# Patient Record
Sex: MALE | Race: WHITE | NOT HISPANIC OR LATINO | Employment: OTHER | URBAN - NONMETROPOLITAN AREA
[De-identification: names, ages, dates, MRNs, and addresses within clinical notes are randomized per-mention and may not be internally consistent; named-entity substitution may affect disease eponyms.]

---

## 2023-10-13 LAB
NON-UH HIE TROPONIN I HIGH SENSITIVITY: ABNORMAL PG/ML (ref 0–20)
NON-UH HIE TROPONIN I HIGH SENSITIVITY: ABNORMAL PG/ML (ref 0–20)

## 2023-10-14 LAB
NON-UH HIE ANION GAP: 12.1 (ref 6–15)
NON-UH HIE BASOPHILS # (AUTO): 0.1 10*3/UL (ref 0–0.2)
NON-UH HIE BASOPHILS % (AUTO): 0.8 %
NON-UH HIE BLOOD UREA NITROGEN: 19 MG/DL (ref 7–25)
NON-UH HIE CALCIUM: 8.9 MG/DL (ref 8.6–10.3)
NON-UH HIE CARBON DIOXIDE: 23.1 MMOL/L (ref 21–31)
NON-UH HIE CHLORIDE: 105 MMOL/L (ref 98–107)
NON-UH HIE CHOL/HDL RATIO: 2.9
NON-UH HIE CHOLESTEROL: 76 MG/DL (ref 140–200)
NON-UH HIE CREATININE CLR CALC PHARMACY: 51.22
NON-UH HIE CREATININE: 1.04 MG/DL (ref 0.7–1.3)
NON-UH HIE EOSINOPHILS # (AUTO): 0.2 10*3/UL (ref 0–0.45)
NON-UH HIE EOSINOPHILS % (AUTO): 1.8 %
NON-UH HIE ESTIMATED GFR: > 60
NON-UH HIE GLUCOSE: 157 MG/DL (ref 70–100)
NON-UH HIE HDL CHOLESTEROL: 26 MG/DL (ref 23–92)
NON-UH HIE HEMATOCRIT: 38 % (ref 38.8–50)
NON-UH HIE HEMOGLOBIN: 12.9 G/DL (ref 13–17)
NON-UH HIE LDL CHOLESTEROL,CALCULATED: 29 MG/DL (ref 0–100)
NON-UH HIE LYMPHOCYTES # (AUTO): 1.4 10*3/UL (ref 1–4.8)
NON-UH HIE LYMPHOCYTES % (AUTO): 11.9 %
NON-UH HIE MEAN CORPUSCULAR HEMOGLOBIN: 30.1 PG (ref 27.5–35.2)
NON-UH HIE MEAN CORPUSCULAR HGB CONC: 34 G/DL (ref 32.5–35.6)
NON-UH HIE MEAN CORPUSCULAR VOLUME: 88.6 FL (ref 83.5–101)
NON-UH HIE MEAN PLATELET VOLUME: 8.4 FL (ref 6.6–10.1)
NON-UH HIE MONOCYTES # (AUTO): 0.7 10*3/UL (ref 0–0.8)
NON-UH HIE MONOCYTES % (AUTO): 5.7 %
NON-UH HIE NEUTROPHILS # (AUTO): 9.4 10*3/UL (ref 1.8–7.7)
NON-UH HIE NEUTROPHILS % (AUTO): 79.8 %
NON-UH HIE NRBC%: 0 /100{WBC} (ref 0–0.5)
NON-UH HIE PLATELET COUNT: 222 10*3/UL (ref 150–450)
NON-UH HIE POTASSIUM: 4.2 MMOL/L (ref 3.5–5.1)
NON-UH HIE RED BLOOD COUNT: 4.29 (ref 3.9–5.6)
NON-UH HIE RED CELL DISTRIBUTION WIDTH: 14.6 % (ref 12–14.8)
NON-UH HIE SODIUM: 136 MMOL/L (ref 136–145)
NON-UH HIE TRIGLYCERIDE W/REFLEX: 104 MG/DL (ref 0–149)
NON-UH HIE TROPONIN I HIGH SENSITIVITY: ABNORMAL PG/ML (ref 0–20)
NON-UH HIE UNCORRECTED WBC: 11.8 10*3/UL (ref 4.1–10.5)
NON-UH HIE VLDL CHOLESTEROL: 20 MG/DL
NON-UH HIE WHITE BLOOD COUNT: 11.8 10*3/UL (ref 4.1–10.5)

## 2023-10-14 PROCEDURE — 93306 TTE W/DOPPLER COMPLETE: CPT | Performed by: INTERNAL MEDICINE

## 2023-10-24 ENCOUNTER — TELEPHONE (OUTPATIENT)
Dept: CARDIOLOGY | Facility: CLINIC | Age: 83
End: 2023-10-24
Payer: OTHER GOVERNMENT

## 2023-10-24 DIAGNOSIS — I21.4 NSTEMI (NON-ST ELEVATED MYOCARDIAL INFARCTION) (MULTI): Primary | ICD-10-CM

## 2023-10-24 NOTE — TELEPHONE ENCOUNTER
Daughter in law phoned asking when patient will see Dr. Mike Polanco DO in office due to recent [facility] discharge for a heart attack. Medical records requested. Sent message to scheduling to follow up.

## 2023-11-08 ENCOUNTER — TELEPHONE (OUTPATIENT)
Dept: CARDIOLOGY | Facility: CLINIC | Age: 83
End: 2023-11-08
Payer: COMMERCIAL

## 2023-11-08 DIAGNOSIS — I21.4 NSTEMI (NON-ST ELEVATED MYOCARDIAL INFARCTION) (MULTI): ICD-10-CM

## 2023-11-08 RX ORDER — ASPIRIN 81 MG/1
81 TABLET ORAL DAILY
COMMUNITY
Start: 2023-09-22

## 2023-11-08 RX ORDER — ATORVASTATIN CALCIUM 80 MG/1
40 TABLET, FILM COATED ORAL NIGHTLY
COMMUNITY
Start: 2023-09-22

## 2023-11-08 RX ORDER — NITROGLYCERIN 0.4 MG/1
0.4 TABLET SUBLINGUAL EVERY 5 MIN PRN
COMMUNITY
Start: 2023-09-22

## 2023-11-08 RX ORDER — TICAGRELOR 90 MG/1
90 TABLET ORAL EVERY 12 HOURS
COMMUNITY
Start: 2023-09-22 | End: 2023-10-22

## 2023-11-08 NOTE — TELEPHONE ENCOUNTER
Nurse at Cardiac Rehab at [facility] called states they need a new order for Cardiac Rehab due to a new stent placed since 09/2023. He can start 11/13/2023..    Fax new order referral to [phone]

## 2023-12-20 ENCOUNTER — APPOINTMENT (OUTPATIENT)
Dept: CARDIOLOGY | Facility: CLINIC | Age: 83
End: 2023-12-20
Payer: COMMERCIAL

## 2023-12-21 ENCOUNTER — APPOINTMENT (OUTPATIENT)
Dept: CARDIOLOGY | Facility: CLINIC | Age: 83
End: 2023-12-21
Payer: COMMERCIAL

## 2024-04-10 ENCOUNTER — TELEPHONE (OUTPATIENT)
Dept: CARDIOLOGY | Facility: CLINIC | Age: 84
End: 2024-04-10

## 2024-04-10 ENCOUNTER — OFFICE VISIT (OUTPATIENT)
Dept: CARDIOLOGY | Facility: CLINIC | Age: 84
End: 2024-04-10
Payer: OTHER GOVERNMENT

## 2024-04-10 VITALS
HEIGHT: 64 IN | WEIGHT: 142.4 LBS | DIASTOLIC BLOOD PRESSURE: 72 MMHG | SYSTOLIC BLOOD PRESSURE: 160 MMHG | BODY MASS INDEX: 24.31 KG/M2 | HEART RATE: 80 BPM

## 2024-04-10 DIAGNOSIS — Z98.61 HISTORY OF PTCA: ICD-10-CM

## 2024-04-10 DIAGNOSIS — E11.9 TYPE 2 DIABETES MELLITUS WITHOUT COMPLICATION, WITHOUT LONG-TERM CURRENT USE OF INSULIN (MULTI): ICD-10-CM

## 2024-04-10 DIAGNOSIS — I10 ESSENTIAL HYPERTENSION: ICD-10-CM

## 2024-04-10 DIAGNOSIS — I25.5 ISCHEMIC CARDIOMYOPATHY: ICD-10-CM

## 2024-04-10 DIAGNOSIS — I50.9 CONGESTIVE HEART FAILURE, NYHA CLASS 2 AND ACC/AHA STAGE C (MULTI): ICD-10-CM

## 2024-04-10 DIAGNOSIS — E78.2 MIXED HYPERLIPIDEMIA: ICD-10-CM

## 2024-04-10 DIAGNOSIS — I25.119 CORONARY ARTERY DISEASE INVOLVING NATIVE CORONARY ARTERY OF NATIVE HEART WITH ANGINA PECTORIS (CMS-HCC): ICD-10-CM

## 2024-04-10 DIAGNOSIS — F17.200 CURRENT SMOKER: ICD-10-CM

## 2024-04-10 PROBLEM — E78.5 HYPERLIPIDEMIA: Status: ACTIVE | Noted: 2024-04-10

## 2024-04-10 PROBLEM — J44.9 COPD (CHRONIC OBSTRUCTIVE PULMONARY DISEASE) (MULTI): Status: ACTIVE | Noted: 2024-04-10

## 2024-04-10 PROBLEM — I25.9 CHRONIC ISCHEMIC HEART DISEASE: Status: ACTIVE | Noted: 2024-04-10

## 2024-04-10 PROBLEM — K21.9 GASTROESOPHAGEAL REFLUX DISEASE: Status: ACTIVE | Noted: 2024-04-10

## 2024-04-10 PROBLEM — I25.9 CHRONIC ISCHEMIC HEART DISEASE: Status: RESOLVED | Noted: 2024-04-10 | Resolved: 2024-04-10

## 2024-04-10 PROCEDURE — 4004F PT TOBACCO SCREEN RCVD TLK: CPT | Performed by: INTERNAL MEDICINE

## 2024-04-10 PROCEDURE — 1159F MED LIST DOCD IN RCRD: CPT | Performed by: INTERNAL MEDICINE

## 2024-04-10 PROCEDURE — 3077F SYST BP >= 140 MM HG: CPT | Performed by: INTERNAL MEDICINE

## 2024-04-10 PROCEDURE — 99215 OFFICE O/P EST HI 40 MIN: CPT | Performed by: INTERNAL MEDICINE

## 2024-04-10 PROCEDURE — 1160F RVW MEDS BY RX/DR IN RCRD: CPT | Performed by: INTERNAL MEDICINE

## 2024-04-10 PROCEDURE — 99406 BEHAV CHNG SMOKING 3-10 MIN: CPT | Performed by: INTERNAL MEDICINE

## 2024-04-10 PROCEDURE — 3078F DIAST BP <80 MM HG: CPT | Performed by: INTERNAL MEDICINE

## 2024-04-10 RX ORDER — LISINOPRIL 20 MG/1
20 TABLET ORAL DAILY
COMMUNITY
Start: 2023-09-22 | End: 2024-04-10 | Stop reason: ALTCHOICE

## 2024-04-10 RX ORDER — SPIRONOLACTONE 25 MG/1
12.5 TABLET ORAL DAILY
Qty: 45 TABLET | Refills: 3 | Status: SHIPPED | OUTPATIENT
Start: 2024-04-10 | End: 2024-04-10 | Stop reason: SDUPTHER

## 2024-04-10 RX ORDER — METFORMIN HYDROCHLORIDE 850 MG/1
850 TABLET ORAL
COMMUNITY
Start: 2023-06-21

## 2024-04-10 RX ORDER — VALSARTAN 80 MG/1
80 TABLET ORAL DAILY
Qty: 90 TABLET | Refills: 3 | Status: SHIPPED | OUTPATIENT
Start: 2024-04-10 | End: 2024-04-10 | Stop reason: SDUPTHER

## 2024-04-10 RX ORDER — METOPROLOL TARTRATE 50 MG/1
50 TABLET ORAL 2 TIMES DAILY
COMMUNITY
Start: 2023-09-22

## 2024-04-10 ASSESSMENT — ENCOUNTER SYMPTOMS: SHORTNESS OF BREATH: 1

## 2024-04-10 NOTE — TELEPHONE ENCOUNTER
Patient's daughter phoned requesting a signed printed script for valsartan, spironolactone to be faxed to [facility] [phone] [facility].  Requested that we send printed scripts 4/15/24, patient is to  current scripts at local pharmacy by 4/12/24.  Send to Dr. Mike Polanco DO for signature after 4/15/24.

## 2024-04-10 NOTE — LETTER
"April 10, 2024     Mallory Talley DO  [address]  [place] [address]    Patient: Jacob Sandoval Sr.   YOB: 1940   Date of Visit: 4/10/2024       Dear Dr. Mallory Talley Do:    Thank you for referring Jacob Sandoval to me for evaluation. Below are my notes for this consultation.  If you have questions, please do not hesitate to call me. I look forward to following your patient along with you.       Sincerely,     Mike Polanco,       CC: No Recipients  ______________________________________________________________________________________    Subjective   Jacob Sandoval Sr. is a 83 y.o. 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 Notably his ostial proximal segment was relatively resistant to dilation due to severe calcification. He had residual 50% mid LAD and 70 to 80% distal circumflex disease. He has inferobasilar severe hypokinesia with ejection fraction of 40%.    He is persistently hypertensive systolic blood pressures running between 1 40-1 60 both measured today.  He continues smoking but has cut down to 6 cigarettes daily and we continue to  him extensively for 5 to 10 minutes today on smoking cessation and its importance    He has underlying COPD, he professes he exercises \"in my garage\" but not truly performing any significant exertional exercise on a treadmill or elliptical or with weights under supervised program at this time    NYHA is class II/C, he has diabetes currently on single agent " "metformin    Recommendations: Smoking cessation counseling, exercise/cardiac rehab phase 3, discontinue lisinopril and initiate valsartan 80 and spironolactone 12.5 daily and follow-up in 6 months         Review of Systems   Respiratory:  Positive for shortness of breath.    All other systems reviewed and are negative.           Vitals:    04/10/24 1002 04/10/24 1041   BP: 142/70 160/72   BP Location: Left arm Left arm   Patient Position: Sitting Sitting   Pulse: 80    Weight: 64.6 kg (142 lb 6.4 oz)    Height: 1.626 m (5' 4\")         Objective   Physical Exam  Constitutional:       Appearance: Normal appearance.   HENT:      Nose: Nose normal.   Neck:      Vascular: No carotid bruit.   Cardiovascular:      Rate and Rhythm: Normal rate.      Pulses: Normal pulses.      Heart sounds: Normal heart sounds.   Pulmonary:      Effort: Pulmonary effort is normal.   Abdominal:      General: Bowel sounds are normal.      Palpations: Abdomen is soft.   Musculoskeletal:         General: Normal range of motion.      Cervical back: Normal range of motion.      Right lower leg: No edema.      Left lower leg: No edema.   Skin:     General: Skin is warm and dry.   Neurological:      General: No focal deficit present.      Mental Status: He is alert.   Psychiatric:         Mood and Affect: Mood normal.         Behavior: Behavior normal.         Thought Content: Thought content normal.         Judgment: Judgment normal.         Allergies  Patient has no known allergies.     Current Medications    Current Outpatient Medications:   •  aspirin 81 mg EC tablet, Take 1 tablet (81 mg) by mouth once daily., Disp: , Rfl:   •  atorvastatin (Lipitor) 80 mg tablet, Take 0.5 tablets (40 mg) by mouth once daily at bedtime. 0.5 tab daily, Disp: , Rfl:   •  metFORMIN (Glucophage) 850 mg tablet, Take 1 tablet (850 mg) by mouth once daily with breakfast., Disp: , Rfl:   •  metoprolol tartrate (Lopressor) 50 mg tablet, 1 tablet 2 times a day. 0.5 tab " daily, Disp: , Rfl:   •  nitroglycerin (Nitrostat) 0.4 mg SL tablet, Place 1 tablet (0.4 mg) under the tongue every 5 minutes if needed., Disp: , Rfl:   •  ticagrelor (Brilinta) 90 mg tablet, Take 1 tablet (90 mg) by mouth 2 times a day., Disp: , Rfl:                      Assessment/Plan   1. Congestive heart failure, NYHA class 2 and ACC/AHA stage C (CMS/HCC)        2. Coronary artery disease involving native coronary artery of native heart with angina pectoris (CMS/[facility])        3. Ischemic cardiomyopathy        4. Essential hypertension        5. Mixed hyperlipidemia        6. History of PTCA        7. Type 2 diabetes mellitus without complication, without long-term current use of insulin (CMS/[facility])        8. BMI 24.0-24.9, adult        9. Current smoker                 Scribe Attestation  By signing my name below, I, Maggie HERNANDEZ LPN  , Scribe   attest that this documentation has been prepared under the direction and in the presence of Mike Polanco DO.     Provider Attestation - Scribe documentation    All medical record entries made by the Scribe were at my direction and personally dictated by me. I have reviewed the chart and agree that the record accurately reflects my personal performance of the history, physical exam, discussion and plan.

## 2024-04-10 NOTE — PROGRESS NOTES
"Subjective   Jacob Sandoval Sr. is a 83 y.o. 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 Notably his ostial proximal segment was relatively resistant to dilation due to severe calcification. He had residual 50% mid LAD and 70 to 80% distal circumflex disease. He has inferobasilar severe hypokinesia with ejection fraction of 40%.    He is persistently hypertensive systolic blood pressures running between 1 40-1 60 both measured today.  He continues smoking but has cut down to 6 cigarettes daily and we continue to  him extensively for 5 to 10 minutes today on smoking cessation and its importance    He has underlying COPD, he professes he exercises \"in my garage\" but not truly performing any significant exertional exercise on a treadmill or elliptical or with weights under supervised program at this time    NYHA is class II/C, he has diabetes currently on single agent metformin    Recommendations: Smoking cessation counseling, exercise/cardiac rehab phase 3, discontinue lisinopril and initiate valsartan 80 and spironolactone 12.5 daily and follow-up in 6 months         Review of Systems   Respiratory:  Positive for shortness of breath.    All other systems reviewed and are negative.           Vitals:    04/10/24 1002 04/10/24 1041   BP: 142/70 160/72   BP Location: Left arm Left arm   Patient Position: Sitting Sitting   Pulse: 80    Weight: 64.6 kg (142 lb 6.4 oz)    Height: 1.626 m (5' 4\")         Objective   Physical Exam  Constitutional:       " Appearance: Normal appearance.   HENT:      Nose: Nose normal.   Neck:      Vascular: No carotid bruit.   Cardiovascular:      Rate and Rhythm: Normal rate.      Pulses: Normal pulses.      Heart sounds: Normal heart sounds.   Pulmonary:      Effort: Pulmonary effort is normal.   Abdominal:      General: Bowel sounds are normal.      Palpations: Abdomen is soft.   Musculoskeletal:         General: Normal range of motion.      Cervical back: Normal range of motion.      Right lower leg: No edema.      Left lower leg: No edema.   Skin:     General: Skin is warm and dry.   Neurological:      General: No focal deficit present.      Mental Status: He is alert.   Psychiatric:         Mood and Affect: Mood normal.         Behavior: Behavior normal.         Thought Content: Thought content normal.         Judgment: Judgment normal.         Allergies  Patient has no known allergies.     Current Medications    Current Outpatient Medications:     aspirin 81 mg EC tablet, Take 1 tablet (81 mg) by mouth once daily., Disp: , Rfl:     atorvastatin (Lipitor) 80 mg tablet, Take 0.5 tablets (40 mg) by mouth once daily at bedtime. 0.5 tab daily, Disp: , Rfl:     metFORMIN (Glucophage) 850 mg tablet, Take 1 tablet (850 mg) by mouth once daily with breakfast., Disp: , Rfl:     metoprolol tartrate (Lopressor) 50 mg tablet, 1 tablet 2 times a day. 0.5 tab daily, Disp: , Rfl:     nitroglycerin (Nitrostat) 0.4 mg SL tablet, Place 1 tablet (0.4 mg) under the tongue every 5 minutes if needed., Disp: , Rfl:     ticagrelor (Brilinta) 90 mg tablet, Take 1 tablet (90 mg) by mouth 2 times a day., Disp: , Rfl:                      Assessment/Plan   1. Congestive heart failure, NYHA class 2 and ACC/AHA stage C (CMS/HCC)        2. Coronary artery disease involving native coronary artery of native heart with angina pectoris (CMS/HCC)        3. Ischemic cardiomyopathy        4. Essential hypertension        5. Mixed hyperlipidemia        6. History of  PTCA        7. Type 2 diabetes mellitus without complication, without long-term current use of insulin (CMS/[facility])        8. BMI 24.0-24.9, adult        9. Current smoker                 Scribe Attestation  By signing my name below, I, Maggie HERNANDEZ LPN  , Scribe   attest that this documentation has been prepared under the direction and in the presence of Mike Polanco DO.     Provider Attestation - Scribe documentation    All medical record entries made by the Scribe were at my direction and personally dictated by me. I have reviewed the chart and agree that the record accurately reflects my personal performance of the history, physical exam, discussion and plan.

## 2024-04-18 RX ORDER — SPIRONOLACTONE 25 MG/1
12.5 TABLET ORAL DAILY
Qty: 45 TABLET | Refills: 3 | Status: SHIPPED | OUTPATIENT
Start: 2024-04-18 | End: 2025-04-18

## 2024-04-18 RX ORDER — VALSARTAN 80 MG/1
80 TABLET ORAL DAILY
Qty: 90 TABLET | Refills: 3 | Status: SHIPPED | OUTPATIENT
Start: 2024-04-18 | End: 2025-04-18

## 2024-04-24 LAB
NON-UH HIE ANION GAP: 11.2 (ref 6–15)
NON-UH HIE BLOOD UREA NITROGEN: 25 MG/DL (ref 7–25)
NON-UH HIE CALCIUM: 10.1 MG/DL (ref 8.6–10.3)
NON-UH HIE CARBON DIOXIDE: 28.7 MMOL/L (ref 21–31)
NON-UH HIE CHLORIDE: 102 MMOL/L (ref 98–107)
NON-UH HIE CREATININE: 1.11 MG/DL (ref 0.7–1.3)
NON-UH HIE ESTIMATED GFR: > 60
NON-UH HIE GLUCOSE: 159 MG/DL (ref 70–100)
NON-UH HIE POTASSIUM: 4.9 MMOL/L (ref 3.5–5.1)
NON-UH HIE SODIUM: 137 MMOL/L (ref 136–145)

## 2024-04-24 NOTE — TELEPHONE ENCOUNTER
Scripts signed and faxed to [phone]. Phoned patient, verbalized understanding. Request that we mail him a copy of signed scripts.

## 2024-05-22 ENCOUNTER — CLINICAL SUPPORT (OUTPATIENT)
Dept: CARDIOLOGY | Facility: CLINIC | Age: 84
End: 2024-05-22
Payer: OTHER GOVERNMENT

## 2024-05-22 VITALS
HEIGHT: 64 IN | BODY MASS INDEX: 23.9 KG/M2 | WEIGHT: 140 LBS | DIASTOLIC BLOOD PRESSURE: 72 MMHG | SYSTOLIC BLOOD PRESSURE: 132 MMHG | HEART RATE: 66 BPM

## 2024-05-22 DIAGNOSIS — I10 ESSENTIAL HYPERTENSION: ICD-10-CM

## 2024-05-22 PROCEDURE — 99211 OFF/OP EST MAY X REQ PHY/QHP: CPT | Performed by: INTERNAL MEDICINE

## 2024-05-22 NOTE — PROGRESS NOTES
"Patient here for a BP check ordered by Dr. Polanco due to diagnosis of hypertension. Dr. Ann in suite. Patient here due to recent medication changes. Lisinopril was discontinued, spironolactone 12.5mg daily started as well as valsartan 80mg daily. Medication list Updated verbally. Patient was SOB walking in from parking lot to appointment. Has been having more balance issues with walking. Discussed with Manju Damian RN prior to discharge.     To Dr. Polanco for review        Vitals:    05/22/24 1644 05/22/24 1646   BP: 140/70 132/72   BP Location: Left arm Right arm   Patient Position: Sitting Sitting   Pulse: 66    Weight: 63.5 kg (140 lb)    Height: 1.626 m (5' 4\")        "

## 2024-10-09 ENCOUNTER — APPOINTMENT (OUTPATIENT)
Dept: CARDIOLOGY | Facility: CLINIC | Age: 84
End: 2024-10-09
Payer: COMMERCIAL

## 2025-03-31 ENCOUNTER — TELEPHONE (OUTPATIENT)
Dept: CARDIOLOGY | Facility: CLINIC | Age: 85
End: 2025-03-31
Payer: COMMERCIAL

## 2025-04-02 ENCOUNTER — APPOINTMENT (OUTPATIENT)
Dept: CARDIOLOGY | Facility: CLINIC | Age: 85
End: 2025-04-02
Payer: COMMERCIAL